# Patient Record
Sex: FEMALE
[De-identification: names, ages, dates, MRNs, and addresses within clinical notes are randomized per-mention and may not be internally consistent; named-entity substitution may affect disease eponyms.]

---

## 2017-09-06 ENCOUNTER — HOSPITAL ENCOUNTER (OUTPATIENT)
Dept: HOSPITAL 5 - CT | Age: 82
Discharge: HOME | End: 2017-09-06
Attending: INTERNAL MEDICINE
Payer: MEDICARE

## 2017-09-06 DIAGNOSIS — I10: ICD-10-CM

## 2017-09-06 DIAGNOSIS — K76.89: ICD-10-CM

## 2017-09-06 DIAGNOSIS — E78.00: ICD-10-CM

## 2017-09-06 DIAGNOSIS — K57.30: ICD-10-CM

## 2017-09-06 DIAGNOSIS — N20.0: Primary | ICD-10-CM

## 2017-09-06 PROCEDURE — 74176 CT ABD & PELVIS W/O CONTRAST: CPT

## 2017-09-06 NOTE — CAT SCAN REPORT
CT OF THE ABDOMEN AND PELVIS WITHOUT CONTRAST



HISTORY:  Lower abdominal pain.



TECHNIQUE: Helical CT without contrast.  Sagittal and coronal 

reformatted images.



FINDINGS:



Compared to 8/26/16. The liver and spleen remain normal size and 

contour. 1.7 cm hepatic cyst in the caudate lobe is noted. The biliary 

system, pancreas and adrenal glands remain unremarkable.



The complex collection posterior to the left kidney has essentially 

resolved with underlying scarring on today's exam. These 3 mm 

nonobstructing stone in the inferior left kidney is unchanged. The 

right kidney is unremarkable. No ureteral stones or bladder abnormality.



Hysterectomy and appendectomy changes are suspected.



There are multiple diverticula in the distal colon. No evidence for 

acute inflammation or obstruction.



Heart size is borderline. The visualized lung bases are clear. No acute 

bony findings or bone lesion.





IMPRESSION:

No acute abdominal process is appreciated.

The left perinephric collection/hematoma has resolved.

Diverticulosis of the distal colon.

Nonobstructing left renal stone.

## 2017-12-19 ENCOUNTER — HOSPITAL ENCOUNTER (OUTPATIENT)
Dept: HOSPITAL 5 - FLUORO | Age: 82
Discharge: HOME | End: 2017-12-19
Attending: ORTHOPAEDIC SURGERY
Payer: MEDICARE

## 2017-12-19 DIAGNOSIS — K21.9: ICD-10-CM

## 2017-12-19 DIAGNOSIS — Z87.891: ICD-10-CM

## 2017-12-19 DIAGNOSIS — I10: ICD-10-CM

## 2017-12-19 DIAGNOSIS — M16.11: Primary | ICD-10-CM

## 2017-12-19 PROCEDURE — 73525 CONTRAST X-RAY OF HIP: CPT

## 2017-12-19 PROCEDURE — 27095 INJECTION FOR HIP X-RAY: CPT

## 2017-12-19 NOTE — FLUOROSCOPY REPORT
FLUOROSCOPY ARTHROGRAM HIP ANESTHESIA, RIGHT



HISTORY: Osteoarthritis of right hip. Right hip pain.



DESCRIPTION OF PROCEDURE: Informed consent was obtained. Sterile 

technique was utilized. 1% lidocaine for skin anesthesia. Using 

fluoroscopic guidance, a 22-gauge spinal needle was advanced to the 

lateral margin of the right femoral head neck junction. A small amount 

of water-soluble contrast was injected to insure intra-articular 

placement. A solution containing 40 mg of Depo-Medrol and 3 cc of 0.25% 

marcaine was injected without incident. The patient tolerated the 

procedure with minor discomfort.



IMPRESSION:

Successful injection of the right hip as described.

## 2018-02-26 ENCOUNTER — HOSPITAL ENCOUNTER (OUTPATIENT)
Dept: HOSPITAL 5 - XRAY | Age: 83
Discharge: HOME | End: 2018-02-26
Attending: ORTHOPAEDIC SURGERY
Payer: MEDICARE

## 2018-02-26 DIAGNOSIS — S42.291D: ICD-10-CM

## 2018-02-26 DIAGNOSIS — M25.511: Primary | ICD-10-CM
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
## 2018-02-26 NOTE — XRAY REPORT
FINAL REPORT



EXAM:  XR SHOULDER 2+V RT



HISTORY:  PAIN IN RT SHOULDER 



TECHNIQUE:  AP, Y, and oblique views of the right shoulder



PRIORS:  None.



FINDINGS:  

Intramedullary finn and multiple screws are present in the

proximal right humerus through a remote fracture of the humeral

neck.



On the lateral view, there may be mild anterior subluxation of

the humeral head which can be correlated clinically. This is not

confirmed on the frontal view. 



There is no evidence of acute fracture. Other joint spaces are

maintained and bony mineralization is normal.  Soft tissues are

unremarkable.



IMPRESSION:  

Positioning of the humeral head on the lateral view suggests a

mild anterior subluxation. However, there is remote deformity of

the humeral head related to prior trauma.

## 2018-03-15 ENCOUNTER — HOSPITAL ENCOUNTER (OUTPATIENT)
Dept: HOSPITAL 5 - OR | Age: 83
Discharge: HOME | End: 2018-03-15
Attending: ORTHOPAEDIC SURGERY
Payer: MEDICARE

## 2018-03-15 VITALS — SYSTOLIC BLOOD PRESSURE: 143 MMHG | DIASTOLIC BLOOD PRESSURE: 64 MMHG

## 2018-03-15 DIAGNOSIS — Z53.8: ICD-10-CM

## 2018-03-15 DIAGNOSIS — S42.201A: Primary | ICD-10-CM

## 2018-03-15 LAB
ALBUMIN SERPL-MCNC: 3.6 G/DL (ref 3.9–5)
ALT SERPL-CCNC: 7 UNITS/L (ref 7–56)
BASOPHILS # (AUTO): 0.1 K/MM3 (ref 0–0.1)
BASOPHILS NFR BLD AUTO: 1.1 % (ref 0–1.8)
BUN SERPL-MCNC: 24 MG/DL (ref 7–17)
BUN/CREAT SERPL: 24 %
CALCIUM SERPL-MCNC: 9.7 MG/DL (ref 8.4–10.2)
EOSINOPHIL # BLD AUTO: 0.3 K/MM3 (ref 0–0.4)
EOSINOPHIL NFR BLD AUTO: 2.8 % (ref 0–4.3)
HCT VFR BLD CALC: 33.7 % (ref 30.3–42.9)
HEMOLYSIS INDEX: 57
HGB BLD-MCNC: 11 GM/DL (ref 10.1–14.3)
LYMPHOCYTES # BLD AUTO: 3.1 K/MM3 (ref 1.2–5.4)
LYMPHOCYTES NFR BLD AUTO: 29.7 % (ref 13.4–35)
MCH RBC QN AUTO: 27 PG (ref 28–32)
MCHC RBC AUTO-ENTMCNC: 33 % (ref 30–34)
MCV RBC AUTO: 83 FL (ref 79–97)
MONOCYTES # (AUTO): 1.1 K/MM3 (ref 0–0.8)
MONOCYTES % (AUTO): 10.3 % (ref 0–7.3)
PLATELET # BLD: 252 K/MM3 (ref 140–440)
RBC # BLD AUTO: 4.05 M/MM3 (ref 3.65–5.03)

## 2018-03-15 PROCEDURE — 85025 COMPLETE CBC W/AUTO DIFF WBC: CPT

## 2018-03-15 PROCEDURE — 36415 COLL VENOUS BLD VENIPUNCTURE: CPT

## 2018-03-15 PROCEDURE — 80053 COMPREHEN METABOLIC PANEL: CPT

## 2018-03-15 NOTE — ANESTHESIA CONSULTATION
Anesthesia Consult and Med Hx


Date of service: 03/15/18





- Airway


Anesthetic Teeth Evaluation: Good


ROM Head & Neck: Adequate


Mental/Hyoid Distance: Adequate


Mallampati Class: Class I


Intubation Access Assessment: Good





- Pulmonary Exam


CTA: Yes





- Cardiac Exam


Cardiac Exam: RRR





- Pre-Operative Health Status


ASA Pre-Surgery Classification: ASA3


Proposed Anesthetic Plan: General





- Pulmonary


Hx Smoking: Yes (Stopped in 1977)


Hx Asthma: No


Hx Sleep Apnea: No (MANUELA PRE SCREEN LOW RISK)





- Cardiovascular System


Hx Hypertension: Yes (1990)


Hx Cardia Arrhythmia: Yes


Hx Heart Murmur: Yes


Hx Peripheral Vascular Disease: Yes





- Central Nervous System


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





- Endocrine


Hx Renal Disease: Yes (CKD)


Hx Liver Disease: Yes (cyst)


Hx Non-Insulin Dependent Diabetes: No


Hx Hypothyroidism: No





- Hematic


Hx Anemia: Yes





- Other Systems


Hx Cancer: No


Hx Obesity: No

## 2018-05-09 ENCOUNTER — HOSPITAL ENCOUNTER (OUTPATIENT)
Dept: HOSPITAL 5 - XRAY | Age: 83
Discharge: HOME | End: 2018-05-09
Attending: ORTHOPAEDIC SURGERY
Payer: MEDICARE
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
## 2018-05-09 DIAGNOSIS — S42.291D: ICD-10-CM

## 2018-05-09 DIAGNOSIS — M25.511: Primary | ICD-10-CM

## 2018-05-09 NOTE — XRAY REPORT
Right shoulder 2 views:



History: Pain in right shoulder.



Findings:



There is internal fixation noted of the fracture neck right humerus 

with intramedullary finn and metallic screw. No evidence of subluxation 

or dislocation identified. Stable hardware.



Impression:



Stable internal fixation.

## 2018-07-06 ENCOUNTER — HOSPITAL ENCOUNTER (OUTPATIENT)
Dept: HOSPITAL 5 - CT | Age: 83
Discharge: HOME | End: 2018-07-06
Attending: ORTHOPAEDIC SURGERY
Payer: MEDICARE

## 2018-07-06 DIAGNOSIS — Z88.5: ICD-10-CM

## 2018-07-06 DIAGNOSIS — Z88.8: ICD-10-CM

## 2018-07-06 DIAGNOSIS — Z88.6: ICD-10-CM

## 2018-07-06 DIAGNOSIS — Z79.899: ICD-10-CM

## 2018-07-06 DIAGNOSIS — M25.511: Primary | ICD-10-CM

## 2018-07-06 DIAGNOSIS — Z79.82: ICD-10-CM

## 2018-07-06 DIAGNOSIS — Z88.1: ICD-10-CM

## 2018-07-06 DIAGNOSIS — Z88.2: ICD-10-CM

## 2018-07-06 PROCEDURE — 23350 INJECTION FOR SHOULDER X-RAY: CPT

## 2018-07-06 PROCEDURE — 73201 CT UPPER EXTREMITY W/DYE: CPT

## 2018-07-06 PROCEDURE — 73040 CONTRAST X-RAY OF SHOULDER: CPT

## 2018-07-06 NOTE — CAT SCAN REPORT
CT UPPER EXTREMITY RIGHT WITH CONTRAST



HISTORY: Pain in right shoulder.



TECHNIQUE: Helical CT images were obtained through the right shoulder 

following intra-articular contrast administration. Sagittal and coronal 

reformatted images. CT arthrogram protocol.



COMPARISON: Multiple previous right shoulder x-rays and operative films.



FINDINGS:



There is good distention of the right glenohumeral joint space with 

contrast. There is no evidence for extravasation of contrast to suggest 

a rotator cuff tear.



The labrum is grossly intact. The biceps tendon and its anchor upon the 

superior labrum is intact.



The bony structures are osteopenic. There is no evidence for acute 

fracture or dislocation. An intramedullary finn has been placed within 

the visualized right humerus. The proximal end of the intramedullary 

finn projects through the anterior cortex of the right humeral neck to 

terminate in the soft tissues. The clinical significance of this is 

unclear. The remaining soft tissues are unremarkable.



IMPRESSION:

No evidence for rotator cuff tear on CT arthrogram.

Osteopenia.

Healing right humeral neck fracture.

The proximal end of the intramedullary finn projects through the 

anterior cortex of the right humeral head and neck region to terminate 

in the soft tissues. The clinical significance of this is unclear. 

Please correlate with the patient.

## 2018-07-06 NOTE — FLUOROSCOPY REPORT
FLUOROSCOPY ARTHROGRAM SHOULDER RIGHT



HISTORY: Pain in right shoulder.



DESCRIPTION OF PROCEDURE: Informed consent was obtained. Sterile 

technique was utilized. 1% lidocaine for skin anesthesia. Using 

fluoroscopy guidance, a 22-gauge spinal needle was advanced into the 

right glenohumeral joint. Approximately 15 cc of Omnipaque contrast 

agent was injected into the joint. There was no obvious extravasation 

of the contrast agent to suggest a rotator cuff tear. Internal fixation 

of the proximal right humerus is noted. No acute process identified. 8 

fluoroscopic images were captured.



IMPRESSION:

Successful fluoroscopy guided right shoulder arthrogram. No evidence 

for rotator cuff tear on fluoroscopy. The patient was sent for CT right 

shoulder arthrogram in stable condition.

## 2018-07-06 NOTE — SHORT STAY SUMMARY
Short Stay Documentation


Date of service: 07/06/18





- History


Principal diagnosis: right shoulder pain





- Allergies and Medications


Current Medications: 


 Allergies





ampicillin Adverse Reaction (Verified 03/06/18 17:37)


 Vomiting


aspirin Adverse Reaction (Verified 03/06/18 17:37)


 Vomiting


 STATES ABLE TO TAKE 81MG WITHOUT PROBLEMS PT AWARE TO STOP PREOP 


cephalexin monohydrate [From Keflex] Adverse Reaction (Verified 03/06/18 17:37)


 WEAKNESS IN LEGS


ciprofloxacin [From Cipro] Adverse Reaction (Verified 03/06/18 17:37)


 Swelling


codeine Adverse Reaction (Verified 03/06/18 17:37)


 Vomiting


diazepam [From Valium] Adverse Reaction (Verified 03/06/18 17:37)


 Vomiting


Iodinated Contrast- Oral and IV Dye [Iodinated Contrast Media - IV Dye] Adverse 

Reaction (Verified 03/06/18 17:37)


 Rash


naproxen [From Naprosyn] Adverse Reaction (Verified 03/06/18 17:37)


 Vomiting


nitrofurantoin [From Macrobid] Adverse Reaction (Verified 03/06/18 17:37)


 Vomiting


pentazocine lactate [From Talwin] Adverse Reaction (Verified 03/15/18 07:50)


 caused an ulcer


phenazopyridine HCl [From Pyridium] Adverse Reaction (Verified 03/06/18 17:37)


 Vomiting


Sulfa (Sulfonamide Antibiotics) Adverse Reaction (Verified 03/06/18 17:37)


 Rash





 Home Medications











 Medication  Instructions  Recorded  Confirmed  Last Taken  Type


 


Latanoprost 0.005% 1 drop OP QPM #1 01/19/18 03/19/18 03/14/18 Rx


 


Aspirin [Lo-Dose Aspirin EC] 81 mg PO DAILY 03/06/18 03/22/18 03/08/18 History


 


Hydrochlorothiazide [HCTZ] 12.5 mg PO QDAY 03/06/18 03/22/18 03/21/18 History


 


Losartan [Cozaar] 50 mg PO QDAY 03/06/18 03/22/18 03/22/18 History


 


Metoprolol Xl [Metoprolol 25 mg PO QDAY 03/06/18 03/22/18 03/22/18 History





SUCCINATE ER TAB]     


 


Omega-3 Fatty Acids/Fish Oil [Fish 1,000 mg PO BID 03/06/18 03/22/18 03/15/18 

History





Oil]     


 


amLODIPine [Norvasc] 5 mg PO BID 03/06/18 03/22/18 03/22/18 History


 


Clindamycin [Clindamycin CAP] 300 mg PO Q8H #30 cap 03/26/18  Unknown Rx














- Physical exam


General appearance: mild distress


Extremities: pulses intact, No edema, normal temperature (mild muscle atrophy 

at shoulder)





- Brief post op/procedure progress note


Date of procedure: 07/06/18


Pre-op diagnosis: shoulder pain


Post-op diagnosis: same


Procedure: 





right shoulder arthrogram


Anesthesia: local


Surgeon: CHUCHO KOCH


Estimated blood loss: none


Pathology: none


Condition: stable





- Hospital course


Hospital course: 





uneventful





- Disposition


Condition at discharge: Good


Disposition: DC-01 TO HOME OR SELFCARE





Short Stay Discharge Plan


Follow up with: 


ERYN CHU MD [Primary Care Provider] - 7 Days

## 2019-03-15 ENCOUNTER — HOSPITAL ENCOUNTER (INPATIENT)
Dept: HOSPITAL 5 - ED | Age: 84
LOS: 3 days | Discharge: HOME | DRG: 392 | End: 2019-03-18
Attending: INTERNAL MEDICINE | Admitting: INTERNAL MEDICINE
Payer: MEDICARE

## 2019-03-15 DIAGNOSIS — Z88.8: ICD-10-CM

## 2019-03-15 DIAGNOSIS — Z98.49: ICD-10-CM

## 2019-03-15 DIAGNOSIS — Z88.2: ICD-10-CM

## 2019-03-15 DIAGNOSIS — Z87.442: ICD-10-CM

## 2019-03-15 DIAGNOSIS — E78.5: ICD-10-CM
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
## 2019-03-15 DIAGNOSIS — Z91.041: ICD-10-CM
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
## 2019-03-15 DIAGNOSIS — S42.211A: ICD-10-CM

## 2019-03-15 DIAGNOSIS — H40.9: ICD-10-CM

## 2019-03-15 DIAGNOSIS — Z82.49: ICD-10-CM

## 2019-03-15 DIAGNOSIS — D64.9: ICD-10-CM

## 2019-03-15 DIAGNOSIS — Z80.9: ICD-10-CM

## 2019-03-15 DIAGNOSIS — N18.9: ICD-10-CM

## 2019-03-15 DIAGNOSIS — N39.0: ICD-10-CM

## 2019-03-15 DIAGNOSIS — I65.23: ICD-10-CM

## 2019-03-15 DIAGNOSIS — I71.4: ICD-10-CM

## 2019-03-15 DIAGNOSIS — Z90.710: ICD-10-CM
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
## 2019-03-15 DIAGNOSIS — Z88.1: ICD-10-CM

## 2019-03-15 DIAGNOSIS — Z88.5: ICD-10-CM

## 2019-03-15 DIAGNOSIS — I35.0: ICD-10-CM

## 2019-03-15 DIAGNOSIS — B96.89: ICD-10-CM

## 2019-03-15 DIAGNOSIS — Z79.82: ICD-10-CM
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
## 2019-03-15 DIAGNOSIS — I12.9: ICD-10-CM

## 2019-03-15 DIAGNOSIS — Z90.49: ICD-10-CM

## 2019-03-15 DIAGNOSIS — R07.89: ICD-10-CM

## 2019-03-15 DIAGNOSIS — K21.9: Primary | ICD-10-CM

## 2019-03-15 LAB
BACTERIA #/AREA URNS HPF: (no result) /HPF
BASOPHILS # (AUTO): 0 K/MM3 (ref 0–0.1)
BASOPHILS NFR BLD AUTO: 0.3 % (ref 0–1.8)
BILIRUB UR QL STRIP: (no result)
BLOOD UR QL VISUAL: (no result)
BUN SERPL-MCNC: 33 MG/DL (ref 7–17)
BUN/CREAT SERPL: 28 %
CALCIUM SERPL-MCNC: 9.4 MG/DL (ref 8.4–10.2)
EOSINOPHIL # BLD AUTO: 0.1 K/MM3 (ref 0–0.4)
EOSINOPHIL NFR BLD AUTO: 0.8 % (ref 0–4.3)
HCT VFR BLD CALC: 31.9 % (ref 30.3–42.9)
HEMOLYSIS INDEX: 8
HGB BLD-MCNC: 10.8 GM/DL (ref 10.1–14.3)
LYMPHOCYTES # BLD AUTO: 1.8 K/MM3 (ref 1.2–5.4)
LYMPHOCYTES NFR BLD AUTO: 19.6 % (ref 13.4–35)
MCHC RBC AUTO-ENTMCNC: 34 % (ref 30–34)
MCV RBC AUTO: 84 FL (ref 79–97)
MONOCYTES # (AUTO): 0.7 K/MM3 (ref 0–0.8)
MONOCYTES % (AUTO): 7.5 % (ref 0–7.3)
MUCOUS THREADS #/AREA URNS HPF: (no result) /HPF
PH UR STRIP: 5 [PH] (ref 5–7)
PLATELET # BLD: 187 K/MM3 (ref 140–440)
PROT UR STRIP-MCNC: (no result) MG/DL
RBC # BLD AUTO: 3.8 M/MM3 (ref 3.65–5.03)
RBC #/AREA URNS HPF: 6 /HPF (ref 0–6)
UROBILINOGEN UR-MCNC: < 2 MG/DL (ref ?–2)
WBC #/AREA URNS HPF: 7 /HPF (ref 0–6)

## 2019-03-15 PROCEDURE — 93010 ELECTROCARDIOGRAM REPORT: CPT

## 2019-03-15 PROCEDURE — A9540 TC99M MAA: HCPCS

## 2019-03-15 PROCEDURE — 36415 COLL VENOUS BLD VENIPUNCTURE: CPT

## 2019-03-15 PROCEDURE — 87186 SC STD MICRODIL/AGAR DIL: CPT

## 2019-03-15 PROCEDURE — A9558 XE133 XENON 10MCI: HCPCS

## 2019-03-15 PROCEDURE — 87076 CULTURE ANAEROBE IDENT EACH: CPT

## 2019-03-15 PROCEDURE — 80048 BASIC METABOLIC PNL TOTAL CA: CPT

## 2019-03-15 PROCEDURE — 85379 FIBRIN DEGRADATION QUANT: CPT

## 2019-03-15 PROCEDURE — 84484 ASSAY OF TROPONIN QUANT: CPT

## 2019-03-15 PROCEDURE — A9502 TC99M TETROFOSMIN: HCPCS

## 2019-03-15 PROCEDURE — 93017 CV STRESS TEST TRACING ONLY: CPT

## 2019-03-15 PROCEDURE — 78452 HT MUSCLE IMAGE SPECT MULT: CPT

## 2019-03-15 PROCEDURE — 71045 X-RAY EXAM CHEST 1 VIEW: CPT

## 2019-03-15 PROCEDURE — 78582 LUNG VENTILAT&PERFUS IMAGING: CPT

## 2019-03-15 PROCEDURE — 81001 URINALYSIS AUTO W/SCOPE: CPT

## 2019-03-15 PROCEDURE — 85025 COMPLETE CBC W/AUTO DIFF WBC: CPT

## 2019-03-15 PROCEDURE — 87086 URINE CULTURE/COLONY COUNT: CPT

## 2019-03-15 PROCEDURE — 93005 ELECTROCARDIOGRAM TRACING: CPT

## 2019-03-15 RX ADMIN — FAMOTIDINE SCH MG: 20 TABLET ORAL at 21:17

## 2019-03-15 RX ADMIN — ACETAMINOPHEN PRN MG: 325 TABLET ORAL at 21:19

## 2019-03-15 RX ADMIN — LATANOPROST SCH DROPS: 50 SOLUTION OPHTHALMIC at 22:38

## 2019-03-15 RX ADMIN — Medication SCH ML: at 21:21

## 2019-03-15 RX ADMIN — HEPARIN SODIUM SCH: 5000 INJECTION, SOLUTION INTRAVENOUS; SUBCUTANEOUS at 21:54

## 2019-03-15 RX ADMIN — METOPROLOL SUCCINATE SCH MG: 25 TABLET, FILM COATED, EXTENDED RELEASE ORAL at 11:58

## 2019-03-15 RX ADMIN — AMLODIPINE BESYLATE SCH: 5 TABLET ORAL at 22:28

## 2019-03-15 RX ADMIN — FAMOTIDINE SCH MG: 20 TABLET ORAL at 11:58

## 2019-03-15 RX ADMIN — HEPARIN SODIUM SCH UNIT: 5000 INJECTION, SOLUTION INTRAVENOUS; SUBCUTANEOUS at 21:20

## 2019-03-15 RX ADMIN — OMEGA-3 FATTY ACIDS CAP 1000 MG SCH MG: 1000 CAP at 22:29

## 2019-03-15 NOTE — HISTORY AND PHYSICAL REPORT
History of Present Illness


Date of examination: 03/15/19


Date of admission: 


03/15/19 08:15





Chief complaint: 


Chest pain





History of present illness: 


Patient is 84 yo with hypertension, hyperlipidemia, CKD. She presents with chest

pain,6/10, excruciating, radiated to back, worse on exertion, worse on 

breathing. She has had shortness of breath on exertion for about 1 month. 

Patient evaluated in ED, initial Troponin was normal. patient states she sees 

Dr. Guerrero in Office and last saw him about 2 weeks ago, on 2/27/18. patient 

states had stress test last year at office of Dr. Oneil. Will admit to r/o ACS








Past History


Past Medical History: hypertension, hyperlipidemia, renal failure, other (AAA, 

diverticulosis,glucoma)


Past Surgical History: appendectomy, cataract removal, hysterectomy, Other (

right shoulder surgery,left renal artery angioplasty and stent)


Social history: lives with family, full code.  denies: smoking, alcohol abuse


Family history: CAD, cancer





Medications and Allergies


                                    Allergies











Allergy/AdvReac Type Severity Reaction Status Date / Time


 


ampicillin AdvReac  Vomiting Verified 03/06/18 17:37


 


aspirin AdvReac  Vomiting Verified 03/06/18 17:37


 


cephalexin monohydrate AdvReac  WEAKNESS Verified 03/06/18 17:37





[From Keflex]   IN LEGS  


 


ciprofloxacin [From Cipro] AdvReac  Swelling Verified 03/06/18 17:37


 


codeine AdvReac  Vomiting Verified 03/06/18 17:37


 


diazepam [From Valium] AdvReac  Vomiting Verified 03/06/18 17:37


 


Iodinated Contrast- Oral and AdvReac  Rash Verified 03/06/18 17:37





IV Dye     





[Iodinated Contrast Media -     





IV Dye]     


 


naproxen [From Naprosyn] AdvReac  Vomiting Verified 03/06/18 17:37


 


nitrofurantoin AdvReac  Vomiting Verified 03/06/18 17:37





[From Macrobid]     


 


pentazocine lactate AdvReac  caused an Verified 03/15/18 07:50





[From Talwin]   ulcer  


 


phenazopyridine HCl AdvReac  Vomiting Verified 03/06/18 17:37





[From Pyridium]     


 


Sulfa (Sulfonamide AdvReac  Rash Verified 03/06/18 17:37





Antibiotics)     











                                Home Medications











 Medication  Instructions  Recorded  Confirmed  Last Taken  Type


 


Latanoprost 0.005% 1 drop OP QPM #1 01/19/18 03/15/19 03/14/19 21:00 Rx


 


Aspirin [Lo-Dose Aspirin EC] 81 mg PO DAILY 03/06/18 03/15/19 03/14/19 09:00 

History


 


Losartan [Cozaar] 50 mg PO QDAY 03/06/18 03/15/19 03/14/19 09:00 History


 


Metoprolol Xl [Metoprolol 25 mg PO QDAY 03/06/18 03/15/19 03/14/19 History





SUCCINATE ER TAB]     


 


Omega-3 Fatty Acids/Fish Oil [Fish 1,000 mg PO BID 03/06/18 03/15/19 03/14/19 

History





Oil]     


 


amLODIPine [Norvasc] 5 mg PO BID 03/06/18 03/15/19 03/14/19 History


 


hydroCHLOROthiazide [HCTZ] 12.5 mg PO QDAY 03/06/18 03/15/19 03/21/18 History


 


Clindamycin [Clindamycin CAP] 300 mg PO Q8H #30 cap 03/26/18  Unknown Rx














Exam





- Physical Exam


Narrative exam: 


GEN: Not in acute distress, lying in bed


HEENT: Normocephalic, atraumatic, 


Neck: supple, No JVD


Lungs: Clear to auscultation bilat, no crackles, no wheeze 


Abd:soft, non tender, non distended, normal bowel sounds


Ext: Trace bilat edema, no clubbing, no cyanosis


Neuro:Awake,alert,oriented X 3, no focal signs


Psych: normal mood








- Constitutional


Vitals: 


                                        











Temp Pulse Resp BP Pulse Ox


 


 97.9 F   85   18   148/43   97 


 


 03/15/19 09:08  03/15/19 09:08  03/15/19 09:08  03/15/19 09:08  03/15/19 09:08














Results





- Labs


CBC & Chem 7: 


                                 03/15/19 06:07





                                 03/15/19 06:07


Labs: 


                              Abnormal lab results











  03/15/19 03/15/19 03/15/19 Range/Units





  06:07 06:07 07:15 


 


Mono % (Auto)  7.5 H    (0.0-7.3)  %


 


Seg Neutrophils %  71.8 H    (40.0-70.0)  %


 


Carbon Dioxide   19 L   (22-30)  mmol/L


 


BUN   33 H   (7-17)  mg/dL


 


Glucose   138 H   ()  mg/dL


 


Urine WBC (Auto)    7.0 H  (0.0-6.0)  /HPF














Assessment and Plan


Chest pain.


Admit to Tele to r/o ACS


Aspirin 324mg taken by patient at home as recommended by  prior to 

coming to hosp


Aspirin 81mg daily, same dose she was taking at home


Serial Troponins


Consult Dr. Guerrero, [facility]





Hypertension


Monitor BP





Hyperlipidemia


Statin





CKD


Monitor





Full code status

## 2019-03-15 NOTE — CONSULTATION
History of Present Illness


Consult date: 03/15/19


Requesting physician: DEVENDRA MERCHANT


Consult reason: chest pain


History of present illness: 


The patient is followed by Dr. Guerrero in our office.  Last night, she started 

experiencing intermittent precordial chest pain with no radiation.  At times, 

there was a pleuritic component to the pain.  There is no associated shortness 

of breath, palpitation or dizziness. Echo 2/19: EF: 55%, mild AS.








Past History


Past Medical History: GERD, hypertension, hyperlipidemia, other (AAA (3.3 cm in 

2/19), Bilateral ICA and ECA stenosis.)


Past Surgical History: appendectomy, cataract removal, hysterectomy, Other 

(right shoulder surgery,left renal artery angioplasty and stent)


Social history: lives with family, full code.  denies: smoking, alcohol abuse


Family history: CAD





Medications and Allergies


                                    Allergies











Allergy/AdvReac Type Severity Reaction Status Date / Time


 


ampicillin AdvReac  Vomiting Verified 03/06/18 17:37


 


aspirin AdvReac  Vomiting Verified 03/06/18 17:37


 


cephalexin monohydrate AdvReac  WEAKNESS Verified 03/06/18 17:37





[From Keflex]   IN LEGS  


 


ciprofloxacin [From Cipro] AdvReac  Swelling Verified 03/06/18 17:37


 


codeine AdvReac  Vomiting Verified 03/06/18 17:37


 


diazepam [From Valium] AdvReac  Vomiting Verified 03/06/18 17:37


 


Iodinated Contrast- Oral and AdvReac  Rash Verified 03/06/18 17:37





IV Dye     





[Iodinated Contrast Media -     





IV Dye]     


 


naproxen [From Naprosyn] AdvReac  Vomiting Verified 03/06/18 17:37


 


nitrofurantoin AdvReac  Vomiting Verified 03/06/18 17:37





[From Macrobid]     


 


pentazocine lactate AdvReac  caused an Verified 03/15/18 07:50





[From Talwin]   ulcer  


 


phenazopyridine HCl AdvReac  Vomiting Verified 03/06/18 17:37





[From Pyridium]     


 


Sulfa (Sulfonamide AdvReac  Rash Verified 03/06/18 17:37





Antibiotics)     











                                Home Medications











 Medication  Instructions  Recorded  Confirmed  Last Taken  Type


 


Latanoprost 0.005% 1 drop OP QPM #1 01/19/18 03/15/19 03/14/19 21:00 Rx


 


Aspirin [Lo-Dose Aspirin EC] 81 mg PO DAILY 03/06/18 03/15/19 03/14/19 09:00 

History


 


Losartan [Cozaar] 50 mg PO QDAY 03/06/18 03/15/19 03/14/19 09:00 History


 


Metoprolol Xl [Metoprolol 25 mg PO QDAY 03/06/18 03/15/19 03/14/19 History





SUCCINATE ER TAB]     


 


Omega-3 Fatty Acids/Fish Oil [Fish 1,000 mg PO BID 03/06/18 03/15/19 03/14/19 

History





Oil]     


 


amLODIPine [Norvasc] 5 mg PO BID 03/06/18 03/15/19 03/14/19 History


 


hydroCHLOROthiazide [HCTZ] 12.5 mg PO QDAY 03/06/18 03/15/19 03/21/18 History


 


Clindamycin [Clindamycin CAP] 300 mg PO Q8H #30 cap 03/26/18  Unknown Rx











Active Meds: 


Active Medications





Acetaminophen (Tylenol)  650 mg PO Q4H PRN


   PRN Reason: Pain MILD(1-3)/Fever >100.5/HA


Al Hydrox/Mg Hydrox/Simethicone (Alum-Mag Hydrox-Simeth 763-027-08yl/5ml)  30 ml

PO Q4H PRN


   PRN Reason: Indigestion


Aspirin (Halfprin Ec)  81 mg PO QDAY [facility]


Famotidine (Pepcid)  20 mg PO BID [facility]


   Last Admin: 03/15/19 11:58 Dose:  20 mg


   Documented by: 


Heparin Sodium (Porcine) (Heparin)  5,000 unit SUB-Q Q12HR [facility]


Metoprolol Succinate (Toprol Xl)  25 mg PO QDAY [facility]


   Last Admin: 03/15/19 11:58 Dose:  25 mg


   Documented by: 


Ondansetron HCl (Zofran)  4 mg IV Q8H PRN


   PRN Reason: Nausea And Vomiting


Sodium Chloride (Sodium Chloride Flush Syringe 10 Ml)  10 ml IV BID [facility]


   Stop: 03/25/19 21:59











Review of Systems


Constitutional: no fever, no chills


Ears, nose, mouth and throat: no ear pain, no ear discharge, no sore throat


Cardiovascular: chest pain, no palpitations, no lightheadedness, no shortness of

breath


Respiratory: no cough, no hemoptysis, no shortness of breath


Gastrointestinal: no abdominal pain, no nausea, no vomiting, no diarrhea, no 

constipation


Genitourinary Female: no dysuria, no urinary frequency


Rectal: no pain, no bleeding


Musculoskeletal: no neck stiffness, no neck pain, no myalgias


Integumentary: no rash, no pruritis


Neurological: no weakness, no parathesias, no headaches


Endocrine: no cold intolerance, no heat intolerance


Hematologic/Lymphatic: no easy bruising, no easy bleeding


Allergic/Immunologic: no urticaria, no wheezing





Physical Examination


                                   Vital Signs











Temp Pulse Resp BP Pulse Ox


 


 97.9 F   89   21   149/54   98 


 


 03/15/19 05:53  03/15/19 05:53  03/15/19 05:53  03/15/19 05:53  03/15/19 05:53











General appearance: no acute distress


HEENT: Positive: EOMI, Normocephaly, Mucus Membranes Moist


Neck: Positive: neck supple, trachea midline


Cardiac: Positive: Reg Rate and Rhythm, S1/S2


Lungs: Positive: clear to auscultation


Neuro: Positive: Grossly Intact


Abdomen: Positive: Soft, Active Bowel Sounds.  Negative: Tender


Skin: Positive: Clear.  Negative: Rash


Musculoskeletal: Normal Range of Motion


Extremities: Present: normal.  Absent: edema





Results





                                 03/15/19 06:07





                                 03/15/19 06:07


                                       CBC











  03/15/19 Range/Units





  06:07 


 


WBC  9.3  (4.5-11.0)  K/mm3


 


RBC  3.80  (3.65-5.03)  M/mm3


 


Hgb  10.8  (10.1-14.3)  gm/dl


 


Hct  31.9  (30.3-42.9)  %


 


Plt Count  187  (140-440)  K/mm3


 


Lymph #  1.8  (1.2-5.4)  K/mm3


 


Mono #  0.7  (0.0-0.8)  K/mm3


 


Eos #  0.1  (0.0-0.4)  K/mm3


 


Baso #  0.0  (0.0-0.1)  K/mm3








                          Comprehensive Metabolic Panel











  03/15/19 Range/Units





  06:07 


 


Sodium  139  (137-145)  mmol/L


 


Potassium  4.1  (3.6-5.0)  mmol/L


 


Chloride  106.6  ()  mmol/L


 


Carbon Dioxide  19 L  (22-30)  mmol/L


 


BUN  33 H  (7-17)  mg/dL


 


Creatinine  1.2  (0.7-1.2)  mg/dL


 


Glucose  138 H  ()  mg/dL


 


Calcium  9.4  (8.4-10.2)  mg/dL














- Imaging and Cardiology


EKG: image reviewed





EKG interpretations





- Telemetry


EKG Rhythm: Sinus Rhythm


Chamber hypertrophy or enlargement: left ventricular hypertro





Assessment and Plan


Obtain d-dimer level.  If negative, schedule Lexiscan stress MPI.








- Patient Problems


(1) Atypical chest pain


Current Visit: Yes   Status: Acute   





(2) Hypertension


Current Visit: Yes   Status: Chronic   


Qualifiers: 


   Hypertension type: essential hypertension   Qualified Code(s): I10 - 

Essential (primary) hypertension   





(3) Carotid stenosis


Current Visit: Yes   Status: Chronic   


Qualifiers: 


   Laterality: bilateral   Qualified Code(s): I65.23 - Occlusion and stenosis of

bilateral carotid arteries   





(4) Mild aortic stenosis


Current Visit: Yes   Status: Chronic   





(5) AAA (abdominal aortic aneurysm)


Current Visit: Yes   Status: Chronic   





(6) GERD (gastroesophageal reflux disease)


Current Visit: Yes   Status: Chronic

## 2019-03-15 NOTE — EMERGENCY DEPARTMENT REPORT
ED Chest Pain HPI





- General


Chief Complaint: Chest Pain


Stated Complaint: CHEST PAIN


Time Seen by Provider: 03/15/19 06:42


Source: patient, EMS


Mode of arrival: Stretcher


Limitations: No Limitations





- History of Present Illness


MD Complaint: chest pain


-: Sudden


Pain Location: left chest


Pain Radiation: back


Severity: moderate


Severity scale (0 -10): 6


Quality: tightness, heaviness


Consistency: constant


Improves With: rest


Worsens With: exertion


re: denies: nausea, vomting, diaphoresis, dyspnea, sense of impending doom


Other Symptoms: denies: cough, fever, syncope, rash, acid taste in mouth, leg 

swelling, palpitations, burping


Treatments Prior to Arrival: aspirin, other


Aspirin use within the Past 7 Days: (1) Yes





- Related Data


On Oral Contraceptives: No


                                Home Medications











 Medication  Instructions  Recorded  Confirmed  Last Taken


 


Aspirin [Lo-Dose Aspirin EC] 81 mg PO DAILY 03/06/18 03/15/19 03/14/19 09:00


 


Losartan [Cozaar] 50 mg PO QDAY 03/06/18 03/15/19 03/14/19 09:00


 


Metoprolol Xl [Metoprolol 25 mg PO QDAY 03/06/18 03/15/19 03/14/19





SUCCINATE ER TAB]    


 


Omega-3 Fatty Acids/Fish Oil [Fish 1,000 mg PO BID 03/06/18 03/15/19 03/14/19





Oil]    


 


amLODIPine [Norvasc] 5 mg PO BID 03/06/18 03/15/19 03/14/19


 


hydroCHLOROthiazide [HCTZ] 12.5 mg PO QDAY 03/06/18 03/15/19 03/21/18








                                  Previous Rx's











 Medication  Instructions  Recorded  Last Taken  Type


 


Latanoprost 0.005% 1 drop OP QPM #1 01/19/18 03/14/19 21:00 Rx


 


Clindamycin [Clindamycin CAP] 300 mg PO Q8H #30 cap 03/26/18 Unknown Rx











                                    Allergies











Allergy/AdvReac Type Severity Reaction Status Date / Time


 


ampicillin AdvReac  Vomiting Verified 03/06/18 17:37


 


aspirin AdvReac  Vomiting Verified 03/06/18 17:37


 


cephalexin monohydrate AdvReac  WEAKNESS Verified 03/06/18 17:37





[From Keflex]   IN LEGS  


 


ciprofloxacin [From Cipro] AdvReac  Swelling Verified 03/06/18 17:37


 


codeine AdvReac  Vomiting Verified 03/06/18 17:37


 


diazepam [From Valium] AdvReac  Vomiting Verified 03/06/18 17:37


 


Iodinated Contrast- Oral and AdvReac  Rash Verified 03/06/18 17:37





IV Dye     





[Iodinated Contrast Media -     





IV Dye]     


 


naproxen [From Naprosyn] AdvReac  Vomiting Verified 03/06/18 17:37


 


nitrofurantoin AdvReac  Vomiting Verified 03/06/18 17:37





[From Macrobid]     


 


pentazocine lactate AdvReac  caused an Verified 03/15/18 07:50





[From Talwin]   ulcer  


 


phenazopyridine HCl AdvReac  Vomiting Verified 03/06/18 17:37





[From Pyridium]     


 


Sulfa (Sulfonamide AdvReac  Rash Verified 03/06/18 17:37





Antibiotics)     














Heart Score





- HEART Score


History: Moderately suspicious


EKG: Normal


Age: > 65


Risk factors: 1-2 risk factors


Troponin: < normal limit


HEART Score: 4





ED Review of Systems


ROS: 


Stated complaint: CHEST PAIN


Other details as noted in HPI





Constitutional: denies: chills, fever


Eyes: denies: eye pain, eye discharge, vision change


ENT: denies: ear pain, throat pain


Respiratory: denies: cough, shortness of breath, wheezing


Cardiovascular: chest pain.  denies: palpitations


Endocrine: no symptoms reported


Gastrointestinal: denies: abdominal pain, nausea, diarrhea


Genitourinary: denies: urgency, dysuria, discharge


Musculoskeletal: denies: back pain, joint swelling, arthralgia


Skin: denies: rash, lesions


Neurological: denies: headache, weakness, paresthesias


Psychiatric: denies: anxiety, depression


Hematological/Lymphatic: denies: easy bleeding, easy bruising





ED Past Medical Hx





- Past Medical History


Previous Medical History?: Yes


Hx Hypertension: Yes (1990)


Hx GERD: Yes


Hx Liver Disease: Yes (cyst)


Hx Renal Disease: Yes (CKD)


Hx Arthritis: Yes


Hx Kidney Stones: Yes


Hx Asthma: No


Hx HIV: No


Additional medical history: Diverticulitis.  AAA





- Surgical History


Past Surgical History?: Yes


Hx Appendectomy: Yes


Additional Surgical History: partial hysterectomy





- Family History


Family history: no significant





- Social History


Smoking Status: Never Smoker


Substance Use Type: None





- Medications


Home Medications: 


                                Home Medications











 Medication  Instructions  Recorded  Confirmed  Last Taken  Type


 


Latanoprost 0.005% 1 drop OP QPM #1 01/19/18 03/15/19 03/14/19 21:00 Rx


 


Aspirin [Lo-Dose Aspirin EC] 81 mg PO DAILY 03/06/18 03/15/19 03/14/19 09:00 

History


 


Losartan [Cozaar] 50 mg PO QDAY 03/06/18 03/15/19 03/14/19 09:00 History


 


Metoprolol Xl [Metoprolol 25 mg PO QDAY 03/06/18 03/15/19 03/14/19 History





SUCCINATE ER TAB]     


 


Omega-3 Fatty Acids/Fish Oil [Fish 1,000 mg PO BID 03/06/18 03/15/19 03/14/19 

History





Oil]     


 


amLODIPine [Norvasc] 5 mg PO BID 03/06/18 03/15/19 03/14/19 History


 


hydroCHLOROthiazide [HCTZ] 12.5 mg PO QDAY 03/06/18 03/15/19 03/21/18 History


 


Clindamycin [Clindamycin CAP] 300 mg PO Q8H #30 cap 03/26/18  Unknown Rx














ED Physical Exam





- General


Limitations: No Limitations


General appearance: alert, in no apparent distress





- Head


Head exam: Present: atraumatic, normocephalic





- Eye


Eye exam: Present: normal appearance





- ENT


ENT exam: Present: mucous membranes moist





- Neck


Neck exam: Present: normal inspection





- Respiratory


Respiratory exam: Present: normal lung sounds bilaterally.  Absent: respiratory 

distress





- Cardiovascular


Cardiovascular Exam: Present: regular rate, normal rhythm.  Absent: systolic 

murmur, diastolic murmur, rubs, gallop





- GI/Abdominal


GI/Abdominal exam: Present: soft, normal bowel sounds





- Rectal


Rectal exam: Present: deferred





- Extremities Exam


Extremities exam: Present: normal inspection





- Back Exam


Back exam: Present: normal inspection





- Neurological Exam


Neurological exam: Present: alert, oriented X3





- Psychiatric


Psychiatric exam: Present: normal affect, normal mood





- Skin


Skin exam: Present: warm, dry, intact, normal color.  Absent: rash





ED Course


                                   Vital Signs











  03/15/19 03/15/19 03/15/19





  05:53 06:00 07:00


 


Temperature 97.9 F  


 


Pulse Rate 89 87 90


 


Respiratory 21 21 18





Rate   


 


Blood Pressure 149/54 152/53 152/53


 


Blood Pressure 149/54  





[Left]   


 


O2 Sat by Pulse 98 98 99





Oximetry   














  03/15/19 03/15/19





  08:00 08:41


 


Temperature  


 


Pulse Rate 90 88


 


Respiratory 22 18





Rate  


 


Blood Pressure 160/57 160/57


 


Blood Pressure  





[Left]  


 


O2 Sat by Pulse 98 





Oximetry  














- Reevaluation(s)


Reevaluation #1: 


Costal results with patient.  Patient agrees with plan of care and admission.  

Patient to be admitted to the hospitalist service.


03/15/19 08:12








- Consultations


Consultation #1: 


Hospitalist consulted for admission.  Hospitalist to admit patient.  Hospitalist

 to assume care of patient.  Bridge orders place.


03/15/19 08:12








ED Medical Decision Making





- Lab Data


Result diagrams: 


                                 03/15/19 06:07





                                 03/15/19 06:07





- EKG Data


-: EKG Interpreted by Me


EKG shows normal: sinus rhythm, axis, intervals, QRS complexes, ST-T waves


Rate: normal





- EKG Data


Interpretation: LVH





- Radiology Data


Radiology results: report reviewed





Negative chest x-ray





- Medical Decision Making





Patient is a 85-year-old female that presents emergency room with complaints of 

chest pain.  Patient to be admitted to the hospitalist service.  Labs 

essentially unremarkable except for elevated creatinine and low GFR consistent 

with her history of chronic kidney disease and  pyuria. 





- Differential Diagnosis


chest pain.  ACS.


Critical care attestation.: 


If time is entered above; I have spent that time in minutes in the direct care 

of this critically ill patient, excluding procedure time.








ED Disposition


Clinical Impression: 


Hypertension


Qualifiers:


 Hypertension type: essential hypertension Qualified Code(s): I10 - Essential 

(primary) hypertension





Chronic kidney disease, unspecified


Qualifiers:


 Chronic kidney disease stage: unspecified stage Qualified Code(s): N18.9 - 

Chronic kidney disease, unspecified





Chest pain


Qualifiers:


 Chest pain type: unspecified Qualified Code(s): R07.9 - Chest pain, unspecified





Disposition: DC-09 OP ADMIT IP TO THIS HOSP


Is pt being admited?: Yes


Does the pt Need Aspirin: No


Condition: Serious


Time of Disposition: 08:14

## 2019-03-15 NOTE — XRAY REPORT
PROCEDURE: XR CHEST 1V AP 

 

TECHNIQUE:  AP portable chest radiograph 

 

HISTORY: Chest Pain 

 

COMPARISONS: None available  

 

FINDINGS: 

 

No mediastinal shift. Cardiac silhouette is not enlarged. No pneumothorax, effusion, or focal pulmona
ry opacity identified. Surgical neck right humerus fracture appears chronic based on remodeling. No a
cute skeletal findings. 

 

IMPRESSION: 

 

No acute pulmonary finding identified. 

  

 

This document is electronically signed by Yimi Louis MD., March 15 2019 06:18:30 AM ET

## 2019-03-16 LAB
BASOPHILS # (AUTO): 0 K/MM3 (ref 0–0.1)
BASOPHILS NFR BLD AUTO: 0.3 % (ref 0–1.8)
BUN SERPL-MCNC: 25 MG/DL (ref 7–17)
BUN/CREAT SERPL: 25 %
CALCIUM SERPL-MCNC: 9.2 MG/DL (ref 8.4–10.2)
EOSINOPHIL # BLD AUTO: 0.3 K/MM3 (ref 0–0.4)
EOSINOPHIL NFR BLD AUTO: 2.6 % (ref 0–4.3)
HCT VFR BLD CALC: 36.5 % (ref 30.3–42.9)
HEMOLYSIS INDEX: 7
HGB BLD-MCNC: 12.3 GM/DL (ref 10.1–14.3)
LYMPHOCYTES # BLD AUTO: 3.8 K/MM3 (ref 1.2–5.4)
LYMPHOCYTES NFR BLD AUTO: 37 % (ref 13.4–35)
MCHC RBC AUTO-ENTMCNC: 34 % (ref 30–34)
MCV RBC AUTO: 85 FL (ref 79–97)
MONOCYTES # (AUTO): 1 K/MM3 (ref 0–0.8)
MONOCYTES % (AUTO): 9.9 % (ref 0–7.3)
PLATELET # BLD: 182 K/MM3 (ref 140–440)
RBC # BLD AUTO: 4.31 M/MM3 (ref 3.65–5.03)

## 2019-03-16 RX ADMIN — METOPROLOL SUCCINATE SCH MG: 25 TABLET, FILM COATED, EXTENDED RELEASE ORAL at 09:35

## 2019-03-16 RX ADMIN — FAMOTIDINE SCH MG: 20 TABLET ORAL at 09:35

## 2019-03-16 RX ADMIN — OMEGA-3 FATTY ACIDS CAP 1000 MG SCH MG: 1000 CAP at 22:57

## 2019-03-16 RX ADMIN — OMEGA-3 FATTY ACIDS CAP 1000 MG SCH MG: 1000 CAP at 09:35

## 2019-03-16 RX ADMIN — FAMOTIDINE SCH MG: 20 TABLET ORAL at 22:57

## 2019-03-16 RX ADMIN — LOSARTAN POTASSIUM SCH MG: 50 TABLET, FILM COATED ORAL at 09:34

## 2019-03-16 RX ADMIN — HEPARIN SODIUM SCH UNIT: 5000 INJECTION, SOLUTION INTRAVENOUS; SUBCUTANEOUS at 22:59

## 2019-03-16 RX ADMIN — ASPIRIN SCH MG: 81 TABLET, COATED ORAL at 09:35

## 2019-03-16 RX ADMIN — Medication SCH ML: at 09:36

## 2019-03-16 RX ADMIN — Medication SCH ML: at 22:59

## 2019-03-16 RX ADMIN — AMLODIPINE BESYLATE SCH MG: 5 TABLET ORAL at 09:35

## 2019-03-16 RX ADMIN — AMLODIPINE BESYLATE SCH MG: 5 TABLET ORAL at 22:58

## 2019-03-16 RX ADMIN — ACETAMINOPHEN PRN MG: 325 TABLET ORAL at 13:05

## 2019-03-16 RX ADMIN — HEPARIN SODIUM SCH UNIT: 5000 INJECTION, SOLUTION INTRAVENOUS; SUBCUTANEOUS at 09:43

## 2019-03-16 RX ADMIN — LATANOPROST SCH DROPS: 50 SOLUTION OPHTHALMIC at 22:58

## 2019-03-16 NOTE — PROGRESS NOTE
Assessment and Plan


Assessment and plan: 


Chest pain.


Admit to Tele to r/o ACS


Aspirin 324mg taken by patient at home as recommended by  prior to 

coming to hosp


Aspirin 81mg daily, same dose she was taking at home


Serial Troponins


Cardiology following


Stress test done but prob with nuclear medicine machine so cannot get results





Elevated d-dimer


v/Q scan ordered pending





Hypertension


Monitor BP





Hyperlipidemia


Statin





CKD


Monitor





Full code status








History


Interval history: 


No chest pain currently








Hospitalist Physical





- Physical exam


Narrative exam: 


GEN: Not in acute distress, lying in bed


HEENT: Normocephalic, atraumatic, 


Neck: supple, No JVD


heart: S1 and s2 reg, no murmurs


Lungs: Clear to auscultation bilat, no crackles, no wheeze 


Abd:soft, non tender, non distended, normal bowel sounds


Ext: Trace bilat edema, no clubbing, no cyanosis


Neuro:Awake,alert,oriented X 3, no focal signs


Psych: normal mood








- Constitutional


Vitals: 


                                        











Temp Pulse Resp BP Pulse Ox


 


 97.6 F   76   18   157/53   94 


 


 03/16/19 09:04  03/16/19 09:35  03/16/19 09:04  03/16/19 11:46  03/16/19 09:04











General appearance: Present: no acute distress





Results





- Labs


CBC & Chem 7: 


                                 03/16/19 05:33





                                 03/16/19 05:33


Labs: 


                             Laboratory Last Values











WBC  10.2 K/mm3 (4.5-11.0)   03/16/19  05:33    


 


RBC  4.31 M/mm3 (3.65-5.03)   03/16/19  05:33    


 


Hgb  12.3 gm/dl (10.1-14.3)   03/16/19  05:33    


 


Hct  36.5 % (30.3-42.9)   03/16/19  05:33    


 


MCV  85 fl (79-97)   03/16/19  05:33    


 


MCH  29 pg (28-32)   03/16/19  05:33    


 


MCHC  34 % (30-34)   03/16/19  05:33    


 


RDW  15.2 % (13.2-15.2)   03/16/19  05:33    


 


Plt Count  182 K/mm3 (140-440)   03/16/19  05:33    


 


Lymph % (Auto)  37.0 % (13.4-35.0)  H  03/16/19  05:33    


 


Mono % (Auto)  9.9 % (0.0-7.3)  H  03/16/19  05:33    


 


Eos % (Auto)  2.6 % (0.0-4.3)   03/16/19  05:33    


 


Baso % (Auto)  0.3 % (0.0-1.8)   03/16/19  05:33    


 


Lymph #  3.8 K/mm3 (1.2-5.4)   03/16/19  05:33    


 


Mono #  1.0 K/mm3 (0.0-0.8)  H  03/16/19  05:33    


 


Eos #  0.3 K/mm3 (0.0-0.4)   03/16/19  05:33    


 


Baso #  0.0 K/mm3 (0.0-0.1)   03/16/19  05:33    


 


Seg Neutrophils %  50.2 % (40.0-70.0)   03/16/19  05:33    


 


Seg Neutrophils #  5.1 K/mm3 (1.8-7.7)   03/16/19  05:33    


 


D-Dimer  1070.47 ng/mlDDU (0-234)  H  03/15/19  14:01    


 


Sodium  146 mmol/L (137-145)  H D 03/16/19  05:33    


 


Potassium  3.8 mmol/L (3.6-5.0)   03/16/19  05:33    


 


Chloride  112.1 mmol/L ()  H  03/16/19  05:33    


 


Carbon Dioxide  20 mmol/L (22-30)  L  03/16/19  05:33    


 


Anion Gap  18 mmol/L  03/16/19  05:33    


 


BUN  25 mg/dL (7-17)  H  03/16/19  05:33    


 


Creatinine  1.0 mg/dL (0.7-1.2)   03/16/19  05:33    


 


Estimated GFR  53 ml/min  03/16/19  05:33    


 


BUN/Creatinine Ratio  25 %  03/16/19  05:33    


 


Glucose  108 mg/dL ()  H  03/16/19  05:33    


 


Calcium  9.2 mg/dL (8.4-10.2)   03/16/19  05:33    


 


Troponin T  < 0.010 ng/mL (0.00-0.029)   03/15/19  11:35    


 


Urine Color  Straw  (Yellow)   03/15/19  07:15    


 


Urine Turbidity  Clear  (Clear)   03/15/19  07:15    


 


Urine pH  5.0  (5.0-7.0)   03/15/19  07:15    


 


Ur Specific Gravity  1.004  (1.003-1.030)   03/15/19  07:15    


 


Urine Protein  <15 mg/dl mg/dL (Negative)   03/15/19  07:15    


 


Urine Glucose (UA)  Neg mg/dL (Negative)   03/15/19  07:15    


 


Urine Ketones  Neg mg/dL (Negative)   03/15/19  07:15    


 


Urine Blood  Neg  (Negative)   03/15/19  07:15    


 


Urine Nitrite  Neg  (Negative)   03/15/19  07:15    


 


Urine Bilirubin  Neg  (Negative)   03/15/19  07:15    


 


Urine Urobilinogen  < 2.0 mg/dL (<2.0)   03/15/19  07:15    


 


Ur Leukocyte Esterase  Sm  (Negative)   03/15/19  07:15    


 


Urine WBC (Auto)  7.0 /HPF (0.0-6.0)  H  03/15/19  07:15    


 


Urine RBC (Auto)  6.0 /HPF (0.0-6.0)   03/15/19  07:15    


 


U Epithel Cells (Auto)  < 1.0 /HPF (0-13.0)   03/15/19  07:15    


 


Urine Bacteria (Auto)  4+ /HPF (Negative)   03/15/19  07:15    


 


Urine Mucus  Few /HPF  03/15/19  07:15

## 2019-03-16 NOTE — PROGRESS NOTE
Hospitalist Physical





- Constitutional


Vitals: 


                                        











Temp Pulse Resp BP Pulse Ox


 


 97.6 F   76   20   157/53   94 


 


 03/16/19 09:04  03/16/19 09:35  03/16/19 13:05  03/16/19 11:46  03/16/19 09:04











General appearance: Present: no acute distress





Results





- Labs


CBC & Chem 7: 


                                 03/16/19 05:33





                                 03/16/19 05:33


Labs: 


                             Laboratory Last Values











WBC  10.2 K/mm3 (4.5-11.0)   03/16/19  05:33    


 


RBC  4.31 M/mm3 (3.65-5.03)   03/16/19  05:33    


 


Hgb  12.3 gm/dl (10.1-14.3)   03/16/19  05:33    


 


Hct  36.5 % (30.3-42.9)   03/16/19  05:33    


 


MCV  85 fl (79-97)   03/16/19  05:33    


 


MCH  29 pg (28-32)   03/16/19  05:33    


 


MCHC  34 % (30-34)   03/16/19  05:33    


 


RDW  15.2 % (13.2-15.2)   03/16/19  05:33    


 


Plt Count  182 K/mm3 (140-440)   03/16/19  05:33    


 


Lymph % (Auto)  37.0 % (13.4-35.0)  H  03/16/19  05:33    


 


Mono % (Auto)  9.9 % (0.0-7.3)  H  03/16/19  05:33    


 


Eos % (Auto)  2.6 % (0.0-4.3)   03/16/19  05:33    


 


Baso % (Auto)  0.3 % (0.0-1.8)   03/16/19  05:33    


 


Lymph #  3.8 K/mm3 (1.2-5.4)   03/16/19  05:33    


 


Mono #  1.0 K/mm3 (0.0-0.8)  H  03/16/19  05:33    


 


Eos #  0.3 K/mm3 (0.0-0.4)   03/16/19  05:33    


 


Baso #  0.0 K/mm3 (0.0-0.1)   03/16/19  05:33    


 


Seg Neutrophils %  50.2 % (40.0-70.0)   03/16/19  05:33    


 


Seg Neutrophils #  5.1 K/mm3 (1.8-7.7)   03/16/19  05:33    


 


D-Dimer  1070.47 ng/mlDDU (0-234)  H  03/15/19  14:01    


 


Sodium  146 mmol/L (137-145)  H D 03/16/19  05:33    


 


Potassium  3.8 mmol/L (3.6-5.0)   03/16/19  05:33    


 


Chloride  112.1 mmol/L ()  H  03/16/19  05:33    


 


Carbon Dioxide  20 mmol/L (22-30)  L  03/16/19  05:33    


 


Anion Gap  18 mmol/L  03/16/19  05:33    


 


BUN  25 mg/dL (7-17)  H  03/16/19  05:33    


 


Creatinine  1.0 mg/dL (0.7-1.2)   03/16/19  05:33    


 


Estimated GFR  53 ml/min  03/16/19  05:33    


 


BUN/Creatinine Ratio  25 %  03/16/19  05:33    


 


Glucose  108 mg/dL ()  H  03/16/19  05:33    


 


Calcium  9.2 mg/dL (8.4-10.2)   03/16/19  05:33    


 


Troponin T  < 0.010 ng/mL (0.00-0.029)   03/15/19  11:35    


 


Urine Color  Straw  (Yellow)   03/15/19  07:15    


 


Urine Turbidity  Clear  (Clear)   03/15/19  07:15    


 


Urine pH  5.0  (5.0-7.0)   03/15/19  07:15    


 


Ur Specific Gravity  1.004  (1.003-1.030)   03/15/19  07:15    


 


Urine Protein  <15 mg/dl mg/dL (Negative)   03/15/19  07:15    


 


Urine Glucose (UA)  Neg mg/dL (Negative)   03/15/19  07:15    


 


Urine Ketones  Neg mg/dL (Negative)   03/15/19  07:15    


 


Urine Blood  Neg  (Negative)   03/15/19  07:15    


 


Urine Nitrite  Neg  (Negative)   03/15/19  07:15    


 


Urine Bilirubin  Neg  (Negative)   03/15/19  07:15    


 


Urine Urobilinogen  < 2.0 mg/dL (<2.0)   03/15/19  07:15    


 


Ur Leukocyte Esterase  Sm  (Negative)   03/15/19  07:15    


 


Urine WBC (Auto)  7.0 /HPF (0.0-6.0)  H  03/15/19  07:15    


 


Urine RBC (Auto)  6.0 /HPF (0.0-6.0)   03/15/19  07:15    


 


U Epithel Cells (Auto)  < 1.0 /HPF (0-13.0)   03/15/19  07:15    


 


Urine Bacteria (Auto)  4+ /HPF (Negative)   03/15/19  07:15    


 


Urine Mucus  Few /HPF  03/15/19  07:15

## 2019-03-16 NOTE — PROGRESS NOTE
Assessment and Plan


[facility]isc[facility]- nuclear images can't be processed as the camera is down.Awaiting work 

up for elevated D dimer.Patient will be here until Monday.Discussed with 

, hospitalist. Will follow.








- Patient Problems


(1) Elevated d-dimer


Current Visit: Yes   Status: Acute   





(2) Atypical chest pain


Current Visit: Yes   Status: Resolved   





(3) Chronic kidney disease, unspecified


Current Visit: Yes   Status: Acute   


Qualifiers: 


   Chronic kidney disease stage: unspecified stage   Qualified Code(s): N18.9 - 

Chronic kidney disease, unspecified   





(4) AAA (abdominal aortic aneurysm)


Current Visit: Yes   Status: Chronic   





(5) Carotid stenosis


Current Visit: Yes   Status: Chronic   


Qualifiers: 


   Laterality: bilateral   Qualified Code(s): I65.23 - Occlusion and stenosis of

bilateral carotid arteries   





(6) GERD (gastroesophageal reflux disease)


Current Visit: Yes   Status: Chronic   





(7) Hypertension


Current Visit: Yes   Status: Chronic   


Qualifiers: 


   Hypertension type: essential hypertension   Qualified Code(s): I10 - 

Essential (primary) hypertension   





(8) Mild aortic stenosis


Current Visit: Yes   Status: Chronic   





(9) Anemia


Current Visit: No   Status: Acute   





(10) Fracture of surgical neck of right humerus


Current Visit: No   Status: Acute   





(11) PVD (peripheral vascular disease)


Current Visit: No   Status: Chronic   





(12) S/P renal artery angioplasty


Current Visit: No   Status: Chronic   





Subjective


Date of service: 03/16/19


Interval history: 


No chest pain.Underwent Lexistress nuclear scan today.She tolerated the 

procedure well.








Objective


                                   Vital Signs











  Temp Pulse Resp BP BP Pulse Ox


 


 03/16/19 13:05    20   


 


 03/16/19 11:46     157/53  


 


 03/16/19 11:44     172/57  


 


 03/16/19 11:43     145/67  


 


 03/16/19 11:40     165/57  


 


 03/16/19 11:29     161/55  


 


 03/16/19 09:35   76   160/45  


 


 03/16/19 09:34   76   160/45  


 


 03/16/19 09:04  97.6 F  82  18  155/52   94


 


 03/16/19 06:04  97.7 F  76  20  160/45   95


 


 03/16/19 04:35   83    


 


 03/16/19 00:41  97.8 F  80  20  149/43   97


 


 03/15/19 21:17   75   164/46  


 


 03/15/19 21:07       98


 


 03/15/19 20:50   71    


 


 03/15/19 19:53  98.4 F  77  20  164/46   96


 


 03/15/19 15:10  97.9 F  78  18   158/33  96














- Physical Examination


General: No Apparent Distress


HEENT: Positive: EOMI, Normocephaly, Mucus Membranes Moist


Neck: Positive: neck supple, trachea midline


Cardiac: Positive: S1/S2 (Loud S2), Systolic Murmur (grade 2 to 3/6 harsh ESM+)


Lungs: Positive: clear to auscultation.  Negative: Rales, Wheezes, Rhonchi


Neuro: Positive: Grossly Intact


Abdomen: Positive: Soft, Active Bowel Sounds.  Negative: Tender


Skin: Positive: Clear.  Negative: Rash


Musculoskeletal: Normal Range of Motion


Extremities: Present: normal, upper extr. pulses, lower extr. pulses.  Absent: 

edema





- Labs and Meds


                                       CBC











  03/16/19 Range/Units





  05:33 


 


WBC  10.2  (4.5-11.0)  K/mm3


 


RBC  4.31  (3.65-5.03)  M/mm3


 


Hgb  12.3  (10.1-14.3)  gm/dl


 


Hct  36.5  (30.3-42.9)  %


 


Plt Count  182  (140-440)  K/mm3


 


Lymph #  3.8  (1.2-5.4)  K/mm3


 


Mono #  1.0 H  (0.0-0.8)  K/mm3


 


Eos #  0.3  (0.0-0.4)  K/mm3


 


Baso #  0.0  (0.0-0.1)  K/mm3








                          Comprehensive Metabolic Panel











  03/16/19 Range/Units





  05:33 


 


Sodium  146 H D  (137-145)  mmol/L


 


Potassium  3.8  (3.6-5.0)  mmol/L


 


Chloride  112.1 H  ()  mmol/L


 


Carbon Dioxide  20 L  (22-30)  mmol/L


 


BUN  25 H  (7-17)  mg/dL


 


Creatinine  1.0  (0.7-1.2)  mg/dL


 


Glucose  108 H  ()  mg/dL


 


Calcium  9.2  (8.4-10.2)  mg/dL














- Imaging and Cardiology


EKG: report reviewed, image reviewed


Pharmacologic stress test: pending





- Telemetry


EKG Rhythm: Sinus Rhythm





- EKG


Sinus rhythms and dysrhythmias: sinus rhythm


Chamber hypertrophy or enlargement: left ventricular hypertro


Repolarization changes or abnormalities: nonspecific abnormality, ST segment, 

and/or T wave

## 2019-03-17 LAB
BUN SERPL-MCNC: 24 MG/DL (ref 7–17)
BUN/CREAT SERPL: 20 %
CALCIUM SERPL-MCNC: 9.4 MG/DL (ref 8.4–10.2)
HEMOLYSIS INDEX: 3

## 2019-03-17 RX ADMIN — HEPARIN SODIUM SCH UNIT: 5000 INJECTION, SOLUTION INTRAVENOUS; SUBCUTANEOUS at 10:20

## 2019-03-17 RX ADMIN — HEPARIN SODIUM SCH UNIT: 5000 INJECTION, SOLUTION INTRAVENOUS; SUBCUTANEOUS at 23:02

## 2019-03-17 RX ADMIN — Medication SCH ML: at 23:03

## 2019-03-17 RX ADMIN — FAMOTIDINE SCH MG: 20 TABLET ORAL at 10:18

## 2019-03-17 RX ADMIN — OMEGA-3 FATTY ACIDS CAP 1000 MG SCH MG: 1000 CAP at 10:18

## 2019-03-17 RX ADMIN — Medication SCH ML: at 10:20

## 2019-03-17 RX ADMIN — ACETAMINOPHEN PRN MG: 325 TABLET ORAL at 23:02

## 2019-03-17 RX ADMIN — DOXYCYCLINE HYCLATE SCH MG: 100 CAPSULE ORAL at 23:02

## 2019-03-17 RX ADMIN — FAMOTIDINE SCH MG: 20 TABLET ORAL at 23:02

## 2019-03-17 RX ADMIN — AMLODIPINE BESYLATE SCH MG: 5 TABLET ORAL at 23:02

## 2019-03-17 RX ADMIN — LATANOPROST SCH DROPS: 50 SOLUTION OPHTHALMIC at 23:01

## 2019-03-17 RX ADMIN — DOXYCYCLINE HYCLATE SCH MG: 100 CAPSULE ORAL at 17:17

## 2019-03-17 RX ADMIN — ACETAMINOPHEN PRN MG: 325 TABLET ORAL at 10:37

## 2019-03-17 RX ADMIN — ASPIRIN SCH MG: 81 TABLET, COATED ORAL at 10:18

## 2019-03-17 RX ADMIN — METOPROLOL SUCCINATE SCH MG: 25 TABLET, FILM COATED, EXTENDED RELEASE ORAL at 10:20

## 2019-03-17 RX ADMIN — AMLODIPINE BESYLATE SCH MG: 5 TABLET ORAL at 10:19

## 2019-03-17 RX ADMIN — LOSARTAN POTASSIUM SCH MG: 50 TABLET, FILM COATED ORAL at 10:19

## 2019-03-17 RX ADMIN — OMEGA-3 FATTY ACIDS CAP 1000 MG SCH MG: 1000 CAP at 23:01

## 2019-03-17 NOTE — PROGRESS NOTE
Assessment and Plan


Assessment and plan: 


Chest pain.


Admit to Tele to r/o ACS


Aspirin 324mg taken by patient at home as recommended by  prior to 

coming to hosp


Aspirin 81mg daily, same dose she was taking at home


Serial Troponins


Cardiology following


Stress test done 3/15 but prob with nuclear medicine machine so cannot get 

results





Elevated d-dimer


v/Q scan ordered pending





UTI with klebsiela oxytoca


Give Doxycycline


patient allergic to many Antibiotics and was given Doxy by Urologist for UTI 

before.





Hypertension


Monitor BP





Hyperlipidemia


Statin





CKD


Monitor





Full code status








History


Interval history: 


No chest pain currently,


Dysuria x 1 week








Hospitalist Physical





- Physical exam


Narrative exam: 


GEN: Not in acute distress, lying in bed


HEENT: Normocephalic, atraumatic, 


Neck: supple, No JVD


heart: S1 and s2 reg, no murmurs, rubs or gallop


Lungs: Clear to auscultation bilat, no crackles, no wheeze 


Abd:soft, non tender, non distended, normal bowel sounds


Ext: No edema, no clubbing or cyanosis


Neuro:Awake,alert,oriented X 3, no focal signs


Psych: normal mood








- Constitutional


Vitals: 


                                        











Temp Pulse Resp BP Pulse Ox


 


 98.3 F   69   20   152/42   96 


 


 03/17/19 08:17  03/17/19 10:20  03/17/19 10:37  03/17/19 10:20  03/17/19 08:17











General appearance: Present: no acute distress





Results





- Labs


CBC & Chem 7: 


                                 03/16/19 05:33





                                 03/17/19 07:39


Labs: 


                             Laboratory Last Values











WBC  10.2 K/mm3 (4.5-11.0)   03/16/19  05:33    


 


RBC  4.31 M/mm3 (3.65-5.03)   03/16/19  05:33    


 


Hgb  12.3 gm/dl (10.1-14.3)   03/16/19  05:33    


 


Hct  36.5 % (30.3-42.9)   03/16/19  05:33    


 


MCV  85 fl (79-97)   03/16/19  05:33    


 


MCH  29 pg (28-32)   03/16/19  05:33    


 


MCHC  34 % (30-34)   03/16/19  05:33    


 


RDW  15.2 % (13.2-15.2)   03/16/19  05:33    


 


Plt Count  182 K/mm3 (140-440)   03/16/19  05:33    


 


Lymph % (Auto)  37.0 % (13.4-35.0)  H  03/16/19  05:33    


 


Mono % (Auto)  9.9 % (0.0-7.3)  H  03/16/19  05:33    


 


Eos % (Auto)  2.6 % (0.0-4.3)   03/16/19  05:33    


 


Baso % (Auto)  0.3 % (0.0-1.8)   03/16/19  05:33    


 


Lymph #  3.8 K/mm3 (1.2-5.4)   03/16/19  05:33    


 


Mono #  1.0 K/mm3 (0.0-0.8)  H  03/16/19  05:33    


 


Eos #  0.3 K/mm3 (0.0-0.4)   03/16/19  05:33    


 


Baso #  0.0 K/mm3 (0.0-0.1)   03/16/19  05:33    


 


Seg Neutrophils %  50.2 % (40.0-70.0)   03/16/19  05:33    


 


Seg Neutrophils #  5.1 K/mm3 (1.8-7.7)   03/16/19  05:33    


 


D-Dimer  1070.47 ng/mlDDU (0-234)  H  03/15/19  14:01    


 


Sodium  141 mmol/L (137-145)   03/17/19  07:39    


 


Potassium  3.6 mmol/L (3.6-5.0)   03/17/19  07:39    


 


Chloride  107.1 mmol/L ()  H  03/17/19  07:39    


 


Carbon Dioxide  22 mmol/L (22-30)   03/17/19  07:39    


 


Anion Gap  16 mmol/L  03/17/19  07:39    


 


BUN  24 mg/dL (7-17)  H  03/17/19  07:39    


 


Creatinine  1.2 mg/dL (0.7-1.2)   03/17/19  07:39    


 


Estimated GFR  43 ml/min  03/17/19  07:39    


 


BUN/Creatinine Ratio  20 %  03/17/19  07:39    


 


Glucose  103 mg/dL ()  H  03/17/19  07:39    


 


Calcium  9.4 mg/dL (8.4-10.2)   03/17/19  07:39    


 


Troponin T  < 0.010 ng/mL (0.00-0.029)   03/15/19  11:35    


 


Urine Color  Straw  (Yellow)   03/15/19  07:15    


 


Urine Turbidity  Clear  (Clear)   03/15/19  07:15    


 


Urine pH  5.0  (5.0-7.0)   03/15/19  07:15    


 


Ur Specific Gravity  1.004  (1.003-1.030)   03/15/19  07:15    


 


Urine Protein  <15 mg/dl mg/dL (Negative)   03/15/19  07:15    


 


Urine Glucose (UA)  Neg mg/dL (Negative)   03/15/19  07:15    


 


Urine Ketones  Neg mg/dL (Negative)   03/15/19  07:15    


 


Urine Blood  Neg  (Negative)   03/15/19  07:15    


 


Urine Nitrite  Neg  (Negative)   03/15/19  07:15    


 


Urine Bilirubin  Neg  (Negative)   03/15/19  07:15    


 


Urine Urobilinogen  < 2.0 mg/dL (<2.0)   03/15/19  07:15    


 


Ur Leukocyte Esterase  Sm  (Negative)   03/15/19  07:15    


 


Urine WBC (Auto)  7.0 /HPF (0.0-6.0)  H  03/15/19  07:15    


 


Urine RBC (Auto)  6.0 /HPF (0.0-6.0)   03/15/19  07:15    


 


U Epithel Cells (Auto)  < 1.0 /HPF (0-13.0)   03/15/19  07:15    


 


Urine Bacteria (Auto)  4+ /HPF (Negative)   03/15/19  07:15    


 


Urine Mucus  Few /HPF  03/15/19  07:15

## 2019-03-18 VITALS — DIASTOLIC BLOOD PRESSURE: 46 MMHG | SYSTOLIC BLOOD PRESSURE: 149 MMHG

## 2019-03-18 RX ADMIN — HEPARIN SODIUM SCH UNIT: 5000 INJECTION, SOLUTION INTRAVENOUS; SUBCUTANEOUS at 09:55

## 2019-03-18 RX ADMIN — AMLODIPINE BESYLATE SCH MG: 5 TABLET ORAL at 09:54

## 2019-03-18 RX ADMIN — OMEGA-3 FATTY ACIDS CAP 1000 MG SCH MG: 1000 CAP at 09:54

## 2019-03-18 RX ADMIN — Medication SCH ML: at 09:55

## 2019-03-18 RX ADMIN — LOSARTAN POTASSIUM SCH MG: 50 TABLET, FILM COATED ORAL at 09:54

## 2019-03-18 RX ADMIN — FAMOTIDINE SCH MG: 20 TABLET ORAL at 09:54

## 2019-03-18 RX ADMIN — DOXYCYCLINE HYCLATE SCH MG: 100 CAPSULE ORAL at 09:53

## 2019-03-18 RX ADMIN — METOPROLOL SUCCINATE SCH MG: 25 TABLET, FILM COATED, EXTENDED RELEASE ORAL at 09:55

## 2019-03-18 RX ADMIN — ASPIRIN SCH MG: 81 TABLET, COATED ORAL at 09:54

## 2019-03-18 NOTE — XRAY REPORT
AP CHEST:



HISTORY: Chest pain



Subtle patchy peribronchial densities have developed in the perihilar 

regions since 3/15/19. These could represent early infiltrates. No 

consolidation, pleural effusion or pneumothorax. Heart size is at the 

upper limits of normal. The bony structures are grossly intact. Chronic 

deformity of the right humeral head is noted.



IMPRESSION:

Patchy bilateral peribronchial infiltrates have developed.

## 2019-03-18 NOTE — NUCLEAR MEDICINE REPORT
LUNG SCAN, VENTILATION AND PERFUSION:



History: Chest pain.



Technique: 5mci of Tc99m MAA was infused for the perfusion images.  

15mci  gas was inhaled for the ventilatory images.



Correlation is made with a chest x-ray dated 3/18/19.



Findings:

Inhalation of Xenon gas demonstrates a normal distribution of the

activity throughout both lungs.  The wash out phases show no focal 

retention of activity.



After injection of Technetium 99m macroaggregated albumin gamma

camera imaging of the lungs in multiple projections demonstrates

normal pulmonary contours with a homogeneous distribution of activity. 

No focal areas of perfusion deficiency are identified.



IMPRESSION:

Low probability for pulmonary embolus.

## 2019-03-18 NOTE — PROGRESS NOTE
Assessment and Plan


Lexiscan MPI stress test negative. V/Q scan low prob for PE. Chest pain 

resolved. 


Currently stable cardiac status. Pt may discharge home from cardiology 

standpoint. Recommend follow up in our office with Dr. Guerrero within 1-2 weeks 

of hospital discharge ([phone]). 





The patient has been seen in conjunction with Dr. Whipple who agrees with the 

assessment and plan of care.








- Patient Problems


(1) Elevated d-dimer


Current Visit: Yes   Status: Acute   





(2) Atypical chest pain


Current Visit: Yes   Status: Resolved   





(3) Chronic kidney disease, unspecified


Current Visit: Yes   Status: Acute   


Qualifiers: 


   Chronic kidney disease stage: unspecified stage   Qualified Code(s): N18.9 - 

Chronic kidney disease, unspecified   





(4) AAA (abdominal aortic aneurysm)


Current Visit: Yes   Status: Chronic   





(5) Carotid stenosis


Current Visit: Yes   Status: Chronic   


Qualifiers: 


   Laterality: bilateral   Qualified Code(s): I65.23 - Occlusion and stenosis of

bilateral carotid arteries   





(6) GERD (gastroesophageal reflux disease)


Current Visit: Yes   Status: Chronic   





(7) Hypertension


Current Visit: Yes   Status: Chronic   


Qualifiers: 


   Hypertension type: essential hypertension   Qualified Code(s): I10 - 

Essential (primary) hypertension   





(8) Mild aortic stenosis


Current Visit: Yes   Status: Chronic   





(9) Anemia


Current Visit: No   Status: Acute   





(10) Fracture of surgical neck of right humerus


Current Visit: No   Status: Acute   





(11) PVD (peripheral vascular disease)


Current Visit: No   Status: Chronic   





(12) S/P renal artery angioplasty


Current Visit: No   Status: Chronic   











Subjective


Date of service: 03/18/19


Principal diagnosis: cp


Interval history: 


pt up in chair, chest pain resolved. 








Objective


                                   Vital Signs











  Temp Pulse Resp BP Pulse Ox


 


 03/18/19 11:41  98.0 F  64  18  149/46  97


 


 03/18/19 09:55   64   154/45 


 


 03/18/19 09:54   63   154/45 


 


 03/18/19 07:47  97.9 F  55 L  18  154/45  96


 


 03/18/19 01:13  98.0 F  62  18  129/36  97


 


 03/17/19 20:52  97.7 F  64  16  132/38  98


 


 03/17/19 19:37      99


 


 03/17/19 18:21  98.1 F  65  16  147/54  96














- Physical Examination


General: No Apparent Distress


HEENT: Positive: EOMI, Normocephaly, Mucus Membranes Moist


Neck: Positive: neck supple, trachea midline


Cardiac: Positive: Reg Rate and Rhythm, S1/S2


Lungs: Positive: clear to auscultation


Neuro: Positive: Grossly Intact


Abdomen: Positive: Soft, Active Bowel Sounds.  Negative: Tender


Skin: Positive: Clear.  Negative: Rash


Musculoskeletal: Normal Range of Motion


Extremities: Present: normal, upper extr. pulses, lower extr. pulses.  Absent: 

edema





- Imaging and Cardiology


EKG: report reviewed, image reviewed





- EKG


Sinus rhythms and dysrhythmias: sinus rhythm


Chamber hypertrophy or enlargement: left ventricular hypertro


Repolarization changes or abnormalities: nonspecific abnormality, ST segment, 

and/or T wave

## 2019-03-18 NOTE — DISCHARGE SUMMARY
Providers





- Providers


Date of Admission: 


03/15/19 08:15





Date of discharge: 03/18/19


Attending physician: 


DEVENDRA MERCHANT





                                        





03/15/19 11:06


Consult to Physician [CONS] Routine 


   Comment: PER DR CEDILLO TO ADD TO LIST


   Consulting Provider: CARLENE CEDILLO


   Physician Instructions: 


   Reason For Exam: CHEST PAIN











Primary care physician: 


[facility], [place]








Hospitalization


Condition: Fair


Hospital course: 


Patient is 84 yo with hypertension, hyperlipidemia, chronic kidney disease,. She

 presented with chest pain and shortness of breath. She was evaluated in ED, 

initial Troponin was normal. She was admitted to rule out acute coronary 

syndrome. She was evaluated by cardiology. Stress test was done was negative.. 

Her d-dimer was elevated therefore V/Q scan was done and was negative. Patient 

was diagnosed with UTI with Klebsiella oxytoca, was put on Doxycycline. She was 

eventually discharged home. Chest pain due to GERD, non cardiac.





Total time spent on discharge, 32 mins, 


Disposition: DC-01 TO HOME OR SELFCARE





- Discharge Diagnoses


(1) Chest pain


Status: Acute   


Qualifiers: 


   Chest pain type: unspecified   Qualified Code(s): R07.9 - Chest pain, 

unspecified   





(2) Elevated d-dimer


Status: Acute   





(3) Hyperlipidemia


Status: Chronic   





(4) Hypertension


Status: Chronic   


Qualifiers: 


   Hypertension type: essential hypertension   Qualified Code(s): I10 - 

Essential (primary) hypertension   





(5) UTI (urinary tract infection)


Status: Acute   





(6) UTI due to Klebsiella species


Status: Acute   





(7) Malnutrition


Status: Acute   





Core Measure Documentation





- Palliative Care


Palliative Care/ Comfort Measures: Not Applicable





- Core Measures


Any of the following diagnoses?: none





Exam





- Physical Exam


Narrative exam: 


GEN: Not in acute distress, lying in bed


HEENT: Normocephalic, atraumatic, 


Neck: supple, No JVD


heart: S1 and s2 reg, no murmurs, rubs or gallop


Lungs: Clear to auscultation bilat, no crackles, no wheeze 


Abd:soft, non tender, non distended, normal bowel sounds


Ext: No edema, no clubbing or cyanosis


Neuro:Awake,alert,oriented X 3, no focal signs


Psych: normal mood








- Constitutional


Vitals: 


                                        











Temp Pulse Resp BP Pulse Ox


 


 98.0 F   64   18   149/46   97 


 


 03/18/19 11:41  03/18/19 11:41  03/18/19 11:41  03/18/19 11:41  03/18/19 11:41














Plan


Activity: advance as tolerated


Diet: low fat, low cholesterol, low salt, renal


Additional Instructions: 1.Follow up with PCP in 1 week.  2.Follow up with 

Cardiology, [facility] in 1 week.


Prescriptions: 


Famotidine [Pepcid] 20 mg PO BID #60 tablet


Doxycycline [Vibramycin CAP] 100 mg PO BID #10 capsule

## 2019-03-19 NOTE — TREADMILL REPORT
SINGLE-ISOTOPE DURAL-STUDY MYOCARDIAL PERFUSION SCAN REPORT



REFERRING PHYSICIAN:  Yimi Peace MD



[facility] and [facility].



The patient received 10 mCi of technetium 99m Myoview intravenously under

resting conditions.  Resting myocardial perfusion scan images were done. 

Subsequently, the patient underwent Lexiscan stress test as per the protocol. 

During Lexiscan stress, the patient received 28 mCi of technetium 99m Myoview

intravenously.



After 30-60 minutes, post stress images were done.  Computerized reconstruction

images were performed for analysis.



The post-stress images revealed uniform distribution of the radiopharmaceutical

in the left ventricular myocardium.  Cinematic display of the gated study did

not reveal any wall motion abnormality.  The left ventricle was hyperdynamic and

LVEF was calculated to be 76%.  No wall motion abnormality was seen.



The resting images were also normal.



CONCLUSION:

1.  Normal resting and stress myocardial perfusion scan images after the patient

underwent Lexiscan stress test.

2.  No wall motion abnormality.

3.  Normal left ventricular ejection fraction of 76% (hyperdynamic left

ventricle).





DD: 03/18/2019 15:10

DT: 03/19/2019 00:19

JOB# [id number]  [id number]

[facility]